# Patient Record
Sex: MALE | Race: OTHER | NOT HISPANIC OR LATINO
[De-identification: names, ages, dates, MRNs, and addresses within clinical notes are randomized per-mention and may not be internally consistent; named-entity substitution may affect disease eponyms.]

---

## 2024-03-14 ENCOUNTER — APPOINTMENT (OUTPATIENT)
Dept: OTOLARYNGOLOGY | Facility: CLINIC | Age: 64
End: 2024-03-14
Payer: COMMERCIAL

## 2024-03-14 VITALS — HEIGHT: 72.5 IN | WEIGHT: 200 LBS | BODY MASS INDEX: 26.8 KG/M2

## 2024-03-14 DIAGNOSIS — Z78.9 OTHER SPECIFIED HEALTH STATUS: ICD-10-CM

## 2024-03-14 DIAGNOSIS — Z86.39 PERSONAL HISTORY OF OTHER ENDOCRINE, NUTRITIONAL AND METABOLIC DISEASE: ICD-10-CM

## 2024-03-14 DIAGNOSIS — H90.A11 CONDUCTIVE HEARING LOSS, UNILATERAL, RIGHT EAR WITH RESTRICTED HEARING ON THE CONTRALATERAL SIDE: ICD-10-CM

## 2024-03-14 DIAGNOSIS — Z82.49 FAMILY HISTORY OF ISCHEMIC HEART DISEASE AND OTHER DISEASES OF THE CIRCULATORY SYSTEM: ICD-10-CM

## 2024-03-14 DIAGNOSIS — Z87.09 PERSONAL HISTORY OF OTHER DISEASES OF THE RESPIRATORY SYSTEM: ICD-10-CM

## 2024-03-14 DIAGNOSIS — Z86.79 PERSONAL HISTORY OF OTHER DISEASES OF THE CIRCULATORY SYSTEM: ICD-10-CM

## 2024-03-14 DIAGNOSIS — H90.A31 MIXED CONDUCTIVE AND SENSORINEURAL HEARING LOSS, UNILATERAL, RIGHT EAR WITH RESTRICTED HEARING ON THE  CONTRALATERAL SIDE: ICD-10-CM

## 2024-03-14 DIAGNOSIS — H90.A22 SENSORINEURAL HEARING LOSS, UNILATERAL, LEFT EAR, WITH RESTRICTED HEARING ON THE CONTRALATERAL SIDE: ICD-10-CM

## 2024-03-14 PROBLEM — Z00.00 ENCOUNTER FOR PREVENTIVE HEALTH EXAMINATION: Status: ACTIVE | Noted: 2024-03-14

## 2024-03-14 PROCEDURE — 99204 OFFICE O/P NEW MOD 45 MIN: CPT

## 2024-03-14 PROCEDURE — 92504 EAR MICROSCOPY EXAMINATION: CPT

## 2024-03-14 PROCEDURE — 92567 TYMPANOMETRY: CPT

## 2024-03-14 PROCEDURE — 92557 COMPREHENSIVE HEARING TEST: CPT

## 2024-03-14 RX ORDER — LEVOTHYROXINE SODIUM 0.11 MG/1
112 TABLET ORAL
Refills: 0 | Status: ACTIVE | COMMUNITY

## 2024-03-14 RX ORDER — LISINOPRIL AND HYDROCHLOROTHIAZIDE TABLETS 20; 12.5 MG/1; MG/1
20-12.5 TABLET ORAL
Refills: 0 | Status: ACTIVE | COMMUNITY

## 2024-03-14 RX ORDER — ATORVASTATIN CALCIUM 20 MG/1
20 TABLET, FILM COATED ORAL
Refills: 0 | Status: ACTIVE | COMMUNITY

## 2024-03-14 NOTE — HISTORY OF PRESENT ILLNESS
[de-identified] : BENSON ARENAS has a history of surgery consultation stapedectomy. Had surgery in the left ear 2002 with good results.  Tinnitus noted intermittently. No vertigo except a positional vertigo attack many years ago. No prior significant infectious ear disease. Hearing loss has interfered with daily activities.

## 2024-03-14 NOTE — ASSESSMENT
[FreeTextEntry1] : Significant conductive hearing loss in the right ear with a history of successful stapes surgery in the left ear many years ago.  The diagnosis is otosclerosis.  Other etiologies are possible but less likely.  We discussed this.  Management strategies reviewed in detail.  He wished to consider surgical intervention but wished to undergo CT imaging first. All risks limitations, complications and alternatives reviewed in detail.  Questions answered. I have referred him for CT imaging.   Live or virtual follow-up recommended after imaging is complete.

## 2024-03-14 NOTE — PHYSICAL EXAM
[Normal] : mucosa is normal [Midline] : trachea located in midline position [FreeTextEntry1] : Procedure: Microscopic Ear Exam  Left ear:  Ear canal intact without inflammation or lesion.   Tympanic membrane intact without inflammation.  Postsurgical change noted.  Right ear:  Ear canal intact without inflammation or lesion.   Tympanic membrane intact without inflammation.  [place] Hearing Assessment 512 Hz: Day test: referred to the right ear Rinne test: 	Right Ear: Air Conduction < Bone Conduction 	Left Ear:   Air Conduction > Bone conduction

## 2024-03-14 NOTE — DATA REVIEWED
[de-identified] : In light of the patients current symptoms, Complete audiometry was ordered and completed today. I have interpreted these results and reviewed them in detail with the patient.  Moderate conductive hearing loss right ear with intact speech recognition.

## 2024-03-14 NOTE — REASON FOR VISIT
[Initial Evaluation] : an initial evaluation for [Hearing Loss] : hearing loss [FreeTextEntry2] : surgery consultation stapedectomy

## 2024-03-27 PROBLEM — H90.A31 MIXED CONDUCTIVE AND SENSORINEURAL HEARING LOSS OF RIGHT EAR WITH RESTRICTED HEARING OF LEFT EAR: Status: ACTIVE | Noted: 2024-03-27

## 2024-03-27 PROBLEM — H90.A22 SENSORINEURAL HEARING LOSS (SNHL) OF LEFT EAR WITH RESTRICTED HEARING OF RIGHT EAR: Status: ACTIVE | Noted: 2024-03-27

## 2024-03-28 ENCOUNTER — OUTPATIENT (OUTPATIENT)
Dept: OUTPATIENT SERVICES | Facility: HOSPITAL | Age: 64
LOS: 1 days | End: 2024-03-28
Payer: COMMERCIAL

## 2024-03-28 ENCOUNTER — APPOINTMENT (OUTPATIENT)
Dept: CT IMAGING | Facility: HOSPITAL | Age: 64
End: 2024-03-28

## 2024-03-28 PROCEDURE — 70480 CT ORBIT/EAR/FOSSA W/O DYE: CPT | Mod: 26

## 2024-03-28 PROCEDURE — 70480 CT ORBIT/EAR/FOSSA W/O DYE: CPT

## 2024-04-15 ENCOUNTER — APPOINTMENT (OUTPATIENT)
Dept: OTOLARYNGOLOGY | Facility: CLINIC | Age: 64
End: 2024-04-15
Payer: COMMERCIAL

## 2024-04-15 DIAGNOSIS — H80.91 UNSPECIFIED OTOSCLEROSIS, RIGHT EAR: ICD-10-CM

## 2024-04-15 PROCEDURE — 99212 OFFICE O/P EST SF 10 MIN: CPT

## 2024-04-15 NOTE — DATA REVIEWED
[de-identified] : Recent audiometry reviewed [de-identified] : CT scan including images reviewed in detail

## 2024-04-15 NOTE — ASSESSMENT
[FreeTextEntry1] : Management options carefully reviewed in detail including but not limited to surgical intervention.  All risks limitations, complications and alternatives reviewed in detail.  Questions answered.  He wished to proceed with surgical planning.  Surgical plan: Right laser stapedotomy, left hand vein graft

## 2024-04-15 NOTE — HISTORY OF PRESENT ILLNESS
[Home] : at home, [unfilled] , at the time of the visit. [Medical Office: ([facility])___] : at the medical office located in  [de-identified] : BENSON ARENAS has a history of otosclerosis. Had surgery in the left ear 2002 with good results.  Tinnitus noted intermittently. No vertigo except a positional vertigo attack many years ago. No prior significant infectious ear disease. Hearing loss has interfered with daily activities. [FreeTextEntry1] : 04/15/2024  No reported change in hearing.  Underwent CT scan evaluation

## 2024-10-02 ENCOUNTER — APPOINTMENT (OUTPATIENT)
Dept: OTOLARYNGOLOGY | Facility: CLINIC | Age: 64
End: 2024-10-02
Payer: COMMERCIAL

## 2024-10-02 VITALS — BODY MASS INDEX: 27.04 KG/M2 | WEIGHT: 204 LBS | HEIGHT: 73 IN

## 2024-10-02 DIAGNOSIS — H90.A11 CONDUCTIVE HEARING LOSS, UNILATERAL, RIGHT EAR WITH RESTRICTED HEARING ON THE CONTRALATERAL SIDE: ICD-10-CM

## 2024-10-02 DIAGNOSIS — H80.91 UNSPECIFIED OTOSCLEROSIS, RIGHT EAR: ICD-10-CM

## 2024-10-02 PROCEDURE — 99213 OFFICE O/P EST LOW 20 MIN: CPT

## 2024-10-02 PROCEDURE — 92567 TYMPANOMETRY: CPT

## 2024-10-02 PROCEDURE — 92504 EAR MICROSCOPY EXAMINATION: CPT

## 2024-10-02 PROCEDURE — 92557 COMPREHENSIVE HEARING TEST: CPT

## 2024-10-02 NOTE — HISTORY OF PRESENT ILLNESS
[de-identified] : BENSON ARENAS has a history of otosclerosis. Had surgery in the left ear 2002 with good results. Tinnitus noted intermittently. No vertigo except a positional vertigo attack many years ago. No prior significant infectious ear disease. Hearing loss has interfered with daily activities.   [FreeTextEntry1] : 10/2/2024 No perceived change in hearing;  intermittent tinnitus reported. no vertigo.

## 2024-10-02 NOTE — ASSESSMENT
[FreeTextEntry1] : The patient is scheduled for surgery later this month.  We discussed this in detail.  Questions were answered.  The patient wished to proceed.

## 2024-10-02 NOTE — DATA REVIEWED
[de-identified] : In light of the patients current symptoms, Complete audiometry was ordered and completed today. I have interpreted these results and reviewed them in detail with the patient.  Moderate conductive hearing loss right ear.

## 2024-10-17 ENCOUNTER — RESULT REVIEW (OUTPATIENT)
Age: 64
End: 2024-10-17

## 2024-10-17 ENCOUNTER — APPOINTMENT (OUTPATIENT)
Age: 64
End: 2024-10-17

## 2024-10-17 PROBLEM — Z48.89 AFTERCARE FOLLOWING SURGERY: Status: ACTIVE | Noted: 2024-10-17

## 2024-10-17 PROCEDURE — 69660 REVISE MIDDLE EAR BONE: CPT | Mod: RT

## 2024-10-17 PROCEDURE — 95867 NDL EMG CRANIAL NRV MUSC UNI: CPT | Mod: 26

## 2024-10-17 PROCEDURE — 37799 UNLISTED PX VASCULAR SURGERY: CPT | Mod: LT

## 2024-10-17 RX ORDER — ONDANSETRON 4 MG/1
4 TABLET ORAL
Qty: 20 | Refills: 1 | Status: ACTIVE | COMMUNITY
Start: 2024-10-17 | End: 1900-01-01

## 2024-10-17 RX ORDER — CEFADROXIL 500 MG/1
500 CAPSULE ORAL TWICE DAILY
Qty: 4 | Refills: 1 | Status: ACTIVE | COMMUNITY
Start: 2024-10-17 | End: 1900-01-01

## 2024-10-17 RX ORDER — METHYLPREDNISOLONE 4 MG/1
4 TABLET ORAL
Qty: 1 | Refills: 0 | Status: ACTIVE | COMMUNITY
Start: 2024-10-17 | End: 1900-01-01

## 2024-10-24 ENCOUNTER — APPOINTMENT (OUTPATIENT)
Dept: OTOLARYNGOLOGY | Facility: CLINIC | Age: 64
End: 2024-10-24
Payer: COMMERCIAL

## 2024-10-24 VITALS — WEIGHT: 200 LBS | HEIGHT: 73 IN | BODY MASS INDEX: 26.51 KG/M2

## 2024-10-24 DIAGNOSIS — Z48.89 ENCOUNTER FOR OTHER SPECIFIED SURGICAL AFTERCARE: ICD-10-CM

## 2024-10-24 PROCEDURE — 99024 POSTOP FOLLOW-UP VISIT: CPT
